# Patient Record
Sex: FEMALE | Race: WHITE
[De-identification: names, ages, dates, MRNs, and addresses within clinical notes are randomized per-mention and may not be internally consistent; named-entity substitution may affect disease eponyms.]

---

## 2019-07-09 ENCOUNTER — HOSPITAL ENCOUNTER (OUTPATIENT)
Dept: HOSPITAL 7 - FB.SDS | Age: 64
Discharge: HOME | End: 2019-07-09
Payer: MEDICARE

## 2019-07-09 VITALS — SYSTOLIC BLOOD PRESSURE: 102 MMHG | HEART RATE: 78 BPM | DIASTOLIC BLOOD PRESSURE: 48 MMHG

## 2019-07-09 DIAGNOSIS — Z88.8: ICD-10-CM

## 2019-07-09 DIAGNOSIS — F41.9: ICD-10-CM

## 2019-07-09 DIAGNOSIS — E03.9: ICD-10-CM

## 2019-07-09 DIAGNOSIS — E78.5: ICD-10-CM

## 2019-07-09 DIAGNOSIS — Z87.891: ICD-10-CM

## 2019-07-09 DIAGNOSIS — E11.9: ICD-10-CM

## 2019-07-09 DIAGNOSIS — K57.30: Primary | ICD-10-CM

## 2019-07-09 DIAGNOSIS — K21.9: ICD-10-CM

## 2019-07-09 DIAGNOSIS — E66.9: ICD-10-CM

## 2019-07-09 DIAGNOSIS — J44.9: ICD-10-CM

## 2019-07-09 DIAGNOSIS — Z79.899: ICD-10-CM

## 2019-07-09 PROCEDURE — 45380 COLONOSCOPY AND BIOPSY: CPT

## 2019-07-09 PROCEDURE — 00811 ANES LWR INTST NDSC NOS: CPT

## 2019-07-09 PROCEDURE — 88305 TISSUE EXAM BY PATHOLOGIST: CPT

## 2019-07-09 NOTE — OR
DATE OF OPERATION:  07/09/2019

 

SURGEON:  Thomas J Mohs, MD

 

PREOPERATIVE DIAGNOSIS:  Chronic diarrhea.

 

POSTOPERATIVE DIAGNOSIS:  Sigmoid diverticulosis.

 

PROCEDURE:  Colonoscopy with biopsies.

 

ANESTHESIA:  IV sedation.

 

PROCEDURE IN DETAIL:  The patient was brought to the procedure room, where she

was placed on her left side and IV sedation administered.  Digital rectal exam

was performed, which was normal.  The colonoscope was inserted and advanced to

the level of the cecum without difficulty.  Cecal position was confirmed by

identifying the appendiceal lumen and ileocecal valve.  Prep was good and

surfaces were well visualized.  Upon withdrawing the scope, the ascending,

transverse, and descending colon were normal in appearance.  Sigmoid colon had a

few diverticula present.  Rectum was normal and retroflexion was normal.  I took

random biopsies throughout all segments of the colon and rectum because of her

chronic diarrhea to check for microscopic colitis.  Air was removed and the

scope withdrawn.  The patient tolerated the procedure well and returned to

recovery in stable condition.  I am going to recommend routine colon screening

in 10 years.

 

Job#: [id number]

DD: 07/09/2019 0848

DT: 07/09/2019 0953 [id number]

## 2019-07-09 NOTE — PCM.OPNOTE
- General Post-Op/Procedure Note


Date of Surgery/Procedure: 07/09/19


Operative Procedure(s): Colonoscopy with bx's


Findings: 





Sig tics


Pre Op Diagnosis: Chronic Diarrhea


Post-Op Diagnosis: Same


Anesthesia Technique: MAC


Primary Surgeon: Thomas J Mohs EBL in mLs: 1


Complications: None


Condition: Good

## 2019-07-09 NOTE — PCM.HPR
H & P Addendum review





- H & P Addendum Review


Date of Original H & P: 07/02/19


Date Reviewed: 07/09/19


Time Reviewed: 08:00


Patient was Examined: No Changes

## 2021-11-07 NOTE — EDM.PDOC
ED HPI GENERAL MEDICAL PROBLEM





- General


Chief Complaint: Genitourinary Problem


Stated Complaint: BLADDER INFECTION PER PT


Time Seen by Provider: 11/07/21 15:25


Source of Information: Reports: Patient


History Limitations: Reports: No Limitations





- History of Present Illness


INITIAL COMMENTS - FREE TEXT/NARRATIVE: 





Patient presented to the ED because of UTI s/s, dysuria and frequency. She was 

diagnosed with UTI 1 week ago but she still have dysuria. There is no fever, 

chills, N/V or flank pain.


  ** urinary


Pain Score (Numeric/FACES): 6





- Related Data


                                    Allergies











Allergy/AdvReac Type Severity Reaction Status Date / Time


 


No Known Allergies Allergy   Verified 11/07/21 15:43











Home Meds: 


                                    Home Meds





Simvastatin 40 mg PO DAILY 08/23/15 [History]


Budesonide/Formoterol [Symbicort 160-4.5 MCG] 1 puff INH BID #1 inhaler 08/25/15

[Rx]


Albuterol Sulfate [Proair Hfa] 2 puff IH Q6H PRN 07/08/19 [History]


Clobetasol [Temovate 0.05% Oint] 1 applic TOP BID 07/08/19 [History]


DULoxetine [Cymbalta] 30 mg PO DAILY 07/08/19 [History]


Leflunomide [Arava] 20 mg PO DAILY 07/08/19 [History]


Levothyroxine 300 mcg PO DAILY 07/08/19 [History]


Levothyroxine Sodium 88 mcg PO MOWEFR 07/08/19 [History]


Metoprolol Tartrate [Lopressor] 12.5 mg PO BID 07/08/19 [History]


Omega-3/DHA/Epa/Fish Oil [Omega-3 Fish Oil 1,000 MG Sfgl] 1,000 mg PO DAILY 

07/08/19 [History]


Triamcinolone Acetonide [Kenalog 0.1% Crm] 1 applic TOP TID PRN 07/08/19 

[History]


Ciprofloxacin HCl [Cipro] 500 mg PO BID #10 tablet 11/07/21 [Rx]


Nitrofurantoin Monohyd/M-Cryst [Macrobid 100 mg Capsule] 100 mg PO BID #10 

capsule 11/07/21 [Rx]











Past Medical History


HEENT History: Reports: Impaired Vision


Cardiovascular History: Reports: Arrhythmia, High Cholesterol


Respiratory History: Reports: Asthma, COPD


Gastrointestinal History: Reports: Other (See Below)


Other Gastrointestinal History: TRAUMA TO SPLEEN WITH HEMATOMA


Genitourinary History: Reports: None


OB/GYN History: Reports: Pregnancy


Musculoskeletal History: Reports: Arthritis, Fracture, Other (See Below)


Other Musculoskeletal History: OSTEOPENIA,.  FX SCAPULA PER HX;.  FX DISTAL LEFT

 RADIUS


Neurological History: Reports: None


Psychiatric History: Reports: Anxiety


Endocrine/Metabolic History: Reports: Hypothyroidism, Obesity/BMI 30+


Hematologic History: Reports: None


Immunologic History: Reports: None


Oncologic (Cancer) History: Reports: None


Dermatologic History: Reports: None





- Past Surgical History


Head Surgeries/Procedures: Reports: None


GI Surgical History: Reports: Appendectomy, Cholecystectomy, Colonoscopy


Endocrine Surgical History: Reports: Thyroidectomy





ED ROS GENERAL





- Review of Systems


Review Of Systems: See Below


Constitutional: Reports: No Symptoms


HEENT: Reports: No Symptoms


Respiratory: Reports: No Symptoms


Cardiovascular: Reports: No Symptoms


Endocrine: Reports: No Symptoms


GI/Abdominal: Reports: No Symptoms


: Reports: Dysuria, Frequency


Musculoskeletal: Reports: No Symptoms


Skin: Reports: No Symptoms


Neurological: Reports: No Symptoms


Psychiatric: Reports: No Symptoms





ED EXAM, RENAL/





- Physical Exam


Exam: See Below


Exam Limited By: No Limitations


General Appearance: Alert, No Apparent Distress


Eye Exam: Bilateral Eye: PERRL


Ears: Normal External Exam, Normal Canal


Nose: Normal Inspection, Normal Mucosa, No Blood


Throat/Mouth: Normal Inspection, Normal Lips, Normal Teeth, Normal Oropharynx, 

Normal Voice


Head: Atraumatic, Normocephalic


Neck: Normal Inspection, Supple, Non-Tender, Full Range of Motion


Respiratory/Chest: No Respiratory Distress, Lungs Clear, Normal Breath Sounds, 

No Accessory Muscle Use, Chest Non-Tender


Cardiovascular: Normal Peripheral Pulses, Regular Rate, Rhythm, No Edema, No 

Gallop, No JVD, No Murmur, No Rub


GI/Abdominal: Normal Bowel Sounds, Soft, No Organomegaly, No Distention, No 

Abnormal Bruit, Other (suprapubic tenderness)


Back Exam: Normal Inspection, Full Range of Motion


Extremities: Normal Inspection, Normal Range of Motion, Non-Tender, No Pedal 

Edema, Normal Capillary Refill


Neurological: Alert, Oriented, CN II-XII Intact, Normal Cognition





Course





- Vital Signs


Last Recorded V/S: 


                                Last Vital Signs











Temp  35.7 C L  11/07/21 15:20


 


Pulse  70   11/07/21 15:20


 


Resp  18   11/07/21 15:20


 


BP  136/66   11/07/21 15:20


 


Pulse Ox  94 L  11/07/21 15:20














- Orders/Labs/Meds


Labs: 


                                Laboratory Tests











  11/07/21 Range/Units





  15:39 


 


Urine Color  Yellow  (YELLOW)  


 


Urine Appearance  Cloudy  (CLEAR)  


 


Urine pH  5.0  (5.0-6.5)  


 


Ur Specific Gravity  1.030 H  (1.010-1.025)  


 


Urine Protein  500 H  (NEGATIVE)  mg/dL


 


Urine Glucose (UA)  Normal  (NORMAL)  mg/dL


 


Urine Ketones  Negative  (NEGATIVE)  mg/dL


 


Urine Occult Blood  Large H  (NEGATIVE)  


 


Urine Nitrite  Positive H  (NEGATIVE)  


 


Urine Bilirubin  Negative  (NEGATIVE)  


 


Urine Urobilinogen  Normal  (NEGATIVE)  mg/dL


 


Ur Leukocyte Esterase  Large H  (NEGATIVE)  


 


Urine RBC  Packed H  (0-5)  


 


Urine WBC  Packed H  (0-5)  











Meds: 


Medications














Discontinued Medications














Generic Name Dose Route Start Last Admin





  Trade Name Freq  PRN Reason Stop Dose Admin


 


Ciprofloxacin  500 mg  11/07/21 15:56  11/07/21 16:19





  Ciprofloxacin 500 Mg Tab  PO  11/07/21 15:57  500 mg





  NOW STA   Administration


 


Nitrofurantoin Macrocrystals  100 mg  11/07/21 15:56  11/07/21 16:19





  Nitrofurantoin Monohydrate/Macrocrystalline 100 Mg Cap  PO  11/07/21 15:57  

100 mg





  NOW STA   Administration














Departure





- Departure


Time of Disposition: 16:00


Disposition: Home, Self-Care 01


Condition: Good


Clinical Impression: 


 UTI (urinary tract infection)








- Discharge Information


Prescriptions: 


Ciprofloxacin HCl [Cipro] 500 mg PO BID #10 tablet


Nitrofurantoin Monohyd/M-Cryst [Macrobid 100 mg Capsule] 100 mg PO BID #10 

capsule


Instructions:  Urinary Tract Infection, Adult, Easy-to-Read


Referrals: 


Sera Loera NP [Primary Care Provider] - 


Forms:  ED Department Discharge


Additional Instructions: 


Please read discharge instructions on UTI


Increase oral fluids


Take cipro 500 mg and Macrobid 500 mg twice daily for 5 days


Follow up your urine culture result this coming Wednesday or Thursday